# Patient Record
Sex: MALE | Race: WHITE | URBAN - METROPOLITAN AREA
[De-identification: names, ages, dates, MRNs, and addresses within clinical notes are randomized per-mention and may not be internally consistent; named-entity substitution may affect disease eponyms.]

---

## 2024-04-22 ENCOUNTER — OFFICE VISIT (OUTPATIENT)
Dept: BEHAVIORAL HEALTH | Facility: CLINIC | Age: 34
End: 2024-04-22
Payer: COMMERCIAL

## 2024-04-22 VITALS
DIASTOLIC BLOOD PRESSURE: 84 MMHG | OXYGEN SATURATION: 97 % | BODY MASS INDEX: 31.07 KG/M2 | SYSTOLIC BLOOD PRESSURE: 126 MMHG | HEIGHT: 70 IN | HEART RATE: 71 BPM | WEIGHT: 217 LBS

## 2024-04-22 DIAGNOSIS — F41.1 GAD (GENERALIZED ANXIETY DISORDER): ICD-10-CM

## 2024-04-22 DIAGNOSIS — F33.1 MDD (MAJOR DEPRESSIVE DISORDER), RECURRENT EPISODE, MODERATE (HCC): ICD-10-CM

## 2024-04-22 DIAGNOSIS — F12.90 CANNABIS USE DISORDER: ICD-10-CM

## 2024-04-22 PROCEDURE — 3079F DIAST BP 80-89 MM HG: CPT | Performed by: STUDENT IN AN ORGANIZED HEALTH CARE EDUCATION/TRAINING PROGRAM

## 2024-04-22 PROCEDURE — 3074F SYST BP LT 130 MM HG: CPT | Performed by: STUDENT IN AN ORGANIZED HEALTH CARE EDUCATION/TRAINING PROGRAM

## 2024-04-22 PROCEDURE — 99205 OFFICE O/P NEW HI 60 MIN: CPT | Performed by: STUDENT IN AN ORGANIZED HEALTH CARE EDUCATION/TRAINING PROGRAM

## 2024-04-22 PROCEDURE — 99417 PROLNG OP E/M EACH 15 MIN: CPT | Performed by: STUDENT IN AN ORGANIZED HEALTH CARE EDUCATION/TRAINING PROGRAM

## 2024-04-22 RX ORDER — SERTRALINE HYDROCHLORIDE 25 MG/1
25 TABLET, FILM COATED ORAL DAILY
Qty: 90 TABLET | Refills: 2 | Status: SHIPPED | OUTPATIENT
Start: 2024-04-22

## 2024-04-22 ASSESSMENT — ANXIETY QUESTIONNAIRES
3. WORRYING TOO MUCH ABOUT DIFFERENT THINGS: NEARLY EVERY DAY
6. BECOMING EASILY ANNOYED OR IRRITABLE: NEARLY EVERY DAY
1. FEELING NERVOUS, ANXIOUS, OR ON EDGE: MORE THAN HALF THE DAYS
GAD7 TOTAL SCORE: 17
5. BEING SO RESTLESS THAT IT IS HARD TO SIT STILL: MORE THAN HALF THE DAYS
7. FEELING AFRAID AS IF SOMETHING AWFUL MIGHT HAPPEN: MORE THAN HALF THE DAYS
IF YOU CHECKED OFF ANY PROBLEMS ON THIS QUESTIONNAIRE, HOW DIFFICULT HAVE THESE PROBLEMS MADE IT FOR YOU TO DO YOUR WORK, TAKE CARE OF THINGS AT HOME, OR GET ALONG WITH OTHER PEOPLE: VERY DIFFICULT
2. NOT BEING ABLE TO STOP OR CONTROL WORRYING: MORE THAN HALF THE DAYS
4. TROUBLE RELAXING: NEARLY EVERY DAY

## 2024-04-22 ASSESSMENT — PATIENT HEALTH QUESTIONNAIRE - PHQ9
5. POOR APPETITE OR OVEREATING: 1 - SEVERAL DAYS
CLINICAL INTERPRETATION OF PHQ2 SCORE: 4
SUM OF ALL RESPONSES TO PHQ QUESTIONS 1-9: 19
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
## 2024-04-25 ENCOUNTER — DOCUMENTATION (OUTPATIENT)
Dept: BEHAVIORAL HEALTH | Facility: CLINIC | Age: 34
End: 2024-04-25
Payer: COMMERCIAL

## 2024-05-02 ENCOUNTER — TELEPHONE (OUTPATIENT)
Dept: BEHAVIORAL HEALTH | Facility: CLINIC | Age: 34
End: 2024-05-02
Payer: COMMERCIAL

## 2024-05-02 NOTE — TELEPHONE ENCOUNTER
Brief note    Was alerted by administrative staff that patient had only received a 30-day supply of his medication, per chart review and per my last note Zoloft 25 mg tablets were prescribed with plan titration of 25 mg for 1 week, then increasing to 50 mg in the second week, and finally titrating up to 75 mg in the last week.  Per chart review order had been sent for Zoloft 25 mg tablets, with a total of 90 tablets, with 2 refills.    Spoke to pharmacy [facility], in [place] on [address] [facility], at [phone], and they confirmed patient was only able to  a 30-day supply of Zoloft 25 mg, it had been noted, they were not able to see the instructions for the titration of the medication.  Spoke to pharmacy technician and he reported that prescription has been updated and medication should be ready for pickup for the patient within 2 hours.    Additionally called the patient at [phone], and spoke with the patient, and patient was able to verbally recall the titration plan, and informed the patient that the medication prescription has been corrected by the pharmacy and he should be able to  the medication within 2 hours.  Patient denies any SI, HI, AVH.  Patient denied any further questions or concerns.

## 2024-06-10 ENCOUNTER — OFFICE VISIT (OUTPATIENT)
Dept: BEHAVIORAL HEALTH | Facility: CLINIC | Age: 34
End: 2024-06-10
Payer: COMMERCIAL

## 2024-06-10 VITALS — DIASTOLIC BLOOD PRESSURE: 72 MMHG | SYSTOLIC BLOOD PRESSURE: 118 MMHG | OXYGEN SATURATION: 95 % | HEART RATE: 113 BPM

## 2024-06-10 DIAGNOSIS — F33.1 MDD (MAJOR DEPRESSIVE DISORDER), RECURRENT EPISODE, MODERATE (HCC): ICD-10-CM

## 2024-06-10 DIAGNOSIS — F41.1 GAD (GENERALIZED ANXIETY DISORDER): ICD-10-CM

## 2024-06-10 PROCEDURE — 3074F SYST BP LT 130 MM HG: CPT | Performed by: STUDENT IN AN ORGANIZED HEALTH CARE EDUCATION/TRAINING PROGRAM

## 2024-06-10 PROCEDURE — 3078F DIAST BP <80 MM HG: CPT | Performed by: STUDENT IN AN ORGANIZED HEALTH CARE EDUCATION/TRAINING PROGRAM

## 2024-06-10 PROCEDURE — 99214 OFFICE O/P EST MOD 30 MIN: CPT | Performed by: STUDENT IN AN ORGANIZED HEALTH CARE EDUCATION/TRAINING PROGRAM

## 2024-06-10 RX ORDER — SERTRALINE HYDROCHLORIDE 25 MG/1
75 TABLET, FILM COATED ORAL DAILY
Qty: 90 TABLET | Refills: 4 | Status: SHIPPED | OUTPATIENT
Start: 2024-06-10

## 2024-06-10 RX ORDER — SERTRALINE HYDROCHLORIDE 25 MG/1
75 TABLET, FILM COATED ORAL DAILY
Qty: 90 TABLET | Refills: 4 | Status: SHIPPED | OUTPATIENT
Start: 2024-06-10 | End: 2024-06-10

## 2024-06-10 ASSESSMENT — ANXIETY QUESTIONNAIRES
2. NOT BEING ABLE TO STOP OR CONTROL WORRYING: NOT AT ALL
1. FEELING NERVOUS, ANXIOUS, OR ON EDGE: SEVERAL DAYS
7. FEELING AFRAID AS IF SOMETHING AWFUL MIGHT HAPPEN: NOT AT ALL
3. WORRYING TOO MUCH ABOUT DIFFERENT THINGS: SEVERAL DAYS
4. TROUBLE RELAXING: SEVERAL DAYS
6. BECOMING EASILY ANNOYED OR IRRITABLE: SEVERAL DAYS
5. BEING SO RESTLESS THAT IT IS HARD TO SIT STILL: NOT AT ALL
IF YOU CHECKED OFF ANY PROBLEMS ON THIS QUESTIONNAIRE, HOW DIFFICULT HAVE THESE PROBLEMS MADE IT FOR YOU TO DO YOUR WORK, TAKE CARE OF THINGS AT HOME, OR GET ALONG WITH OTHER PEOPLE: NOT DIFFICULT AT ALL
GAD7 TOTAL SCORE: 4

## 2024-06-10 ASSESSMENT — PATIENT HEALTH QUESTIONNAIRE - PHQ9
5. POOR APPETITE OR OVEREATING: 2 - MORE THAN HALF THE DAYS
SUM OF ALL RESPONSES TO PHQ QUESTIONS 1-9: 9
CLINICAL INTERPRETATION OF PHQ2 SCORE: 2
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
## 2024-06-10 NOTE — PATIENT INSTRUCTIONS
[email or web address]    Therapy in [facility]    [facility]    [facility]    [facility]  Address: [address] [place] [address]  Phone: [phone]  Notes: [facility] is a consortium of private practitioners who advertise together, and are in business separately as the following: Ashtyn Posadas, Marriage and Family Therapist, Licensed Clinical Alcohol & Drug Counselor; Jake Pierce [facility] Practitioner; Suzi Medrano, Occupational Therapist and Reiki Master; Shobha Oneli, Marriage and Family Therapist  [facility]  Address: [address] [place] [address]  Phone: [phone]  Notes: ADHD skills building, ACT Therapy, CBT, EMDR, Trauma focused therapy, Play based therapy, Family therapy  [facility]  Address: [address] [facility] [address], [place] [address]  Phone: [phone]  Notes: Depression, Anxiety and stress management, marriage and family, DBT, EMDR  [facility]  Address: [address] [address]  Phone: [phone]  Notes: wellness, mindfulness, self-care, LGBTQ+, goal setting and achievement   [facility]  Address: [address] [place] [address]  Phone: [phone]  Notes: Cognitive Behavioral Therapy (CBT), Dialectical Behavioral Therapy (DBT), Emotionally Focused Therapy (EFT), Gottman Couples Therapy, Positive Discipline and Child Education, Play Therapy  [facility]  Address: [address] [facility] [address]  Phone: [phone]  Notes: substance abuse, domestic violence, anger management, marriage and family counseling, mindfulness as well as Substance Abuse and Anger Management/Domestic Violence Groups              [facility]: Ashtyn Dickinson Psy.D, Magdalena Robles LCSW, Precious Trejo, PhD  Address: [address] [facility] [address]  Phone: [phone]  Notes: sleep disorders and health related concerns such as chronic pain,  depression and anxiety  Jon Drake, Ph.D at [facility]  Address: [address], [place] [address]  Phone: [phone]  Notes: sport psychology, resilience, motivation, self-talk, confidence  [facility]  Address: [address], [address], [facility], [address]  Phone: [phone]  Notes: Certified [facility] ([facility]), offering peer services, case management, crisis intervention, Basic Skills Training, and Psycho-Social Rehabilitation; provides MAT for adolescent and adult opioid users, family therapy   [facility]  Address: [address] [place] [address]  Phone: [phone]  Notes: Many group class options, Domestic Violence, Anger Management, Hungarian speaking, Substance Abuse, Peaceful families parenting, Family therapy, Sliding scale fees  [facility]  Address: [address] [place] [address]  Phone: [phone]   Notes: EMDR, Hungarian speaking, Psychedelic assisted therapy, couples and family counseling  [facility]  Address: [address] [place] [address]  Phone: [phone]  Notes: Testing: Cognitive evaluations, Learning disability evaluations, ADHD evaluations, Pre-employment evaluations, Fitness for duty evaluations, Bariatric surgery evaluations, Pain procedure evaluations  [facility]  Address: [address] [place] [address] [address], [place] [address]  Phone: [phone]  Notes: Borderline Personality Disorder, EMDR, Grief Counseling, Couples/family/parenting

## 2024-09-24 ENCOUNTER — OFFICE VISIT (OUTPATIENT)
Dept: BEHAVIORAL HEALTH | Facility: CLINIC | Age: 34
End: 2024-09-24
Payer: COMMERCIAL

## 2024-09-24 VITALS — SYSTOLIC BLOOD PRESSURE: 116 MMHG | HEART RATE: 81 BPM | DIASTOLIC BLOOD PRESSURE: 62 MMHG | OXYGEN SATURATION: 96 %

## 2024-09-24 DIAGNOSIS — F41.1 GAD (GENERALIZED ANXIETY DISORDER): ICD-10-CM

## 2024-09-24 DIAGNOSIS — F33.1 MDD (MAJOR DEPRESSIVE DISORDER), RECURRENT EPISODE, MODERATE (HCC): ICD-10-CM

## 2024-09-24 DIAGNOSIS — F12.90 CANNABIS USE DISORDER: ICD-10-CM

## 2024-09-24 PROCEDURE — 3074F SYST BP LT 130 MM HG: CPT | Performed by: STUDENT IN AN ORGANIZED HEALTH CARE EDUCATION/TRAINING PROGRAM

## 2024-09-24 PROCEDURE — 3078F DIAST BP <80 MM HG: CPT | Performed by: STUDENT IN AN ORGANIZED HEALTH CARE EDUCATION/TRAINING PROGRAM

## 2024-09-24 PROCEDURE — 99214 OFFICE O/P EST MOD 30 MIN: CPT | Performed by: STUDENT IN AN ORGANIZED HEALTH CARE EDUCATION/TRAINING PROGRAM

## 2024-09-24 RX ORDER — SERTRALINE HYDROCHLORIDE 25 MG/1
75 TABLET, FILM COATED ORAL DAILY
Qty: 90 TABLET | Refills: 4 | Status: SHIPPED | OUTPATIENT
Start: 2024-09-24
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
## 2024-09-24 NOTE — PATIENT INSTRUCTIONS
[email or web address]    [email or web address]    [facility] [place]    988    1-800-QUIT-NOW (175-193-8413) or enroll online at www.[email or web address]    [facility]    [facility]    [facility]  Address: [address] [place] [address]  Phone: [phone]  Notes: [facility] is a consortium of private practitioners who advertise together, and are in business separately as the following: Ashtyn Posadas, Marriage and Family Therapist, Licensed Clinical Alcohol & Drug Counselor; Jake Pierce [facility] Practitioner; Suzi Medrano, Occupational Therapist and Reiki Master; Shobha Oneil, Marriage and Family Therapist  [facility]  Address: [id number] [place] [address]  Phone: [phone]  Notes: ADHD skills building, ACT Therapy, CBT, EMDR, Trauma focused therapy, Play based therapy, Family therapy  [facility]  Address: [address] [address], [place] [address]  Phone: [phone]  Notes: Depression, Anxiety and stress management, marriage and family, DBT, EMDR  [facility]  Address: [address] [address]  Phone: [phone]  Notes: wellness, mindfulness, self-care, LGBTQ+, goal setting and achievement   [facility]  Address: [address], [place] [address]  Phone: [phone]  Notes: Cognitive Behavioral Therapy (CBT), Dialectical Behavioral Therapy (DBT), Emotionally Focused Therapy (EFT), Gottman Couples Therapy, Positive Discipline and Child Education, Play Therapy  [facility]  Address: [address] [facility] [address]  Phone: [phone]  Notes: substance abuse, domestic violence, anger management, marriage and family counseling, mindfulness as well as Substance Abuse and Anger Management/Domestic Violence Groups              [facility]: Melvin Villalobos.D, CHAPO TongW, Precious Trejo, PhD  Address: [address] [facility] [address]  Phone: [phone]  Notes: sleep disorders and health related concerns such as chronic pain, depression and anxiety  Jon Drake, Ph.D at [employer]  Address: [address], [place] [address]  Phone: [phone]  Notes: sport psychology, resilience, motivation, self-talk, confidence  [facility]  Address: [address], [facility], [address]  Phone: [phone]  Notes: Certified [facility] ([facility]), offering peer services, case management, crisis intervention, Basic Skills Training, and Psycho-Social Rehabilitation; provides MAT for adolescent and adult opioid users, family therapy   [facility]  Address: [address] [place] [address]  Phone: [phone]  Notes: Many group class options, Domestic Violence, Anger Management, Israeli speaking, Substance Abuse, Peaceful families parenting, Family therapy, Sliding scale fees  [facility]  Address: [address] [place] [address]  Phone: [phone]   Notes: EMDR, Israeli speaking, Psychedelic assisted therapy, couples and family counseling  [facility]  Address: [address] [place] [address]  Phone: [phone]  Notes: Testing: Cognitive evaluations, Learning disability evaluations, ADHD evaluations, Pre-employment evaluations, Fitness for duty evaluations, Bariatric surgery evaluations, Pain procedure evaluations  [facility]  Address: [address] [address]  Phone: [phone]  Notes: Borderline Personality Disorder, EMDR, Grief Counseling, Couples/family/parenting     Sleep Hygiene Recommendations:    Good sleep hygiene is an important basic treatment element for sleep disorders regardless of the cause. Sleep hygiene education (outlined below) provides information about lifestyle and environmental factors that may help  or hinder sleep. Sleep hygiene provides an essential foundation for other management approaches, but is not a sufficient treatment for insomnia on its own.    * Maintain a regular bedtime and wakeup schedule, even on weekends and days off. This is one of the most important ways to train your body to know when to sleep, and a regular routine helps regulate your body's internal clock.    * Make the last hour before bed a “wind-down” time. Have a light carbohydrate snack (e.g., crackers, bread, cereal) during this time. Don't engage in activities that are stimulating or mentally active (i.e. watching drama/Scoutr movie, discussion about charged topics such as finances, etc).    * Eat regular meals every day. Regularity in meals will also help to regulate your internal body clock.    * Limit liquid consumption to 8-10 oz in the evening.    * Avoid caffeinated products for several hours before bedtime. Remember even decaffeinated drinks have caffeine in them. It also takes longer to break down caffeine when you get older, so even 1 cup of coffee might linger around for longer.    * Do not consume alcohol too close to bedtime. When alcohol gets metabolized at night, this can actually lead to worsened sleep.    * Make sure your sleeping conditions, including your bed, are comfortable as possible. Wear loose fitting clothes if possible. Your room should be dark and quiet and minimize ambient light and sounds. If you are sharing a bed with a snoring, cover-stealing, or restless partner, make separate, temporary sleeping arrangements until you reestablish a satisfactory sleeping pattern.    * The temperature of your bedroom should be comfortable and on the cool side (around 65-68°F).    * Exercise regularly, but do not engage in activities that raise body temperature (e.g., warm baths, aerobic activity) within 1.5 hours of bedtime.    * If you can’t sleep, get up and pursue some relaxing activity, such as reading or  knitting, until you feel sleepy, do not lie in bed worrying about getting to sleep.    Taking medications to help with sleep:    Most authorities on sleep recommend against use of sedative drugs by these people for the following reasons:    Sedatives modify nervous system activity during sleep: for example, they may reduce the normal period of dreaming. After taking sedatives for a while and then stopping, many people report they have sleep-disrupting dreams, which cause them to wake up feeling tired even after a full night’s sleep.    The human body develops tolerance to sedatives after their repeated use. After a while, you have to take more and more sedatives to make you feel sleepy. A person can become psychologically dependent on sleeping preparations; if you are convinced that’s the only way you can get a good night’s sleep, you won’t be able to go to sleep without a drug.    Hypnotic / sedative medications should be used on a short-term basis, as a bridge to more long-term sustainable behavioral treatment (I.e. CBT-I therapy, sleep hygiene modification, etc).    Online CBT-I (cognitive behavioral therapy for insomnia) Resources:    Insomnia  (free): Offers a self-guided 5-week training plan designed to change sleep habits.  [email or web address].com/            Domestic Violence Resources            Homelessness Resources          Medical Resources          Housing Resources                Food Pantries        Postpartum support and resources